# Patient Record
Sex: MALE | Race: WHITE | NOT HISPANIC OR LATINO | Employment: FULL TIME | URBAN - METROPOLITAN AREA
[De-identification: names, ages, dates, MRNs, and addresses within clinical notes are randomized per-mention and may not be internally consistent; named-entity substitution may affect disease eponyms.]

---

## 2020-11-30 ENCOUNTER — LAB VISIT (OUTPATIENT)
Dept: PRIMARY CARE CLINIC | Facility: OTHER | Age: 40
End: 2020-11-30
Attending: INTERNAL MEDICINE
Payer: COMMERCIAL

## 2020-11-30 DIAGNOSIS — Z03.818 ENCOUNTER FOR OBSERVATION FOR SUSPECTED EXPOSURE TO OTHER BIOLOGICAL AGENTS RULED OUT: ICD-10-CM

## 2020-11-30 PROCEDURE — U0003 INFECTIOUS AGENT DETECTION BY NUCLEIC ACID (DNA OR RNA); SEVERE ACUTE RESPIRATORY SYNDROME CORONAVIRUS 2 (SARS-COV-2) (CORONAVIRUS DISEASE [COVID-19]), AMPLIFIED PROBE TECHNIQUE, MAKING USE OF HIGH THROUGHPUT TECHNOLOGIES AS DESCRIBED BY CMS-2020-01-R: HCPCS

## 2020-12-02 LAB — SARS-COV-2 RNA RESP QL NAA+PROBE: NOT DETECTED

## 2022-02-09 ENCOUNTER — OFFICE VISIT (OUTPATIENT)
Dept: INTERNAL MEDICINE | Facility: CLINIC | Age: 42
End: 2022-02-09
Payer: COMMERCIAL

## 2022-02-09 VITALS
HEIGHT: 71 IN | HEART RATE: 56 BPM | DIASTOLIC BLOOD PRESSURE: 82 MMHG | WEIGHT: 147 LBS | SYSTOLIC BLOOD PRESSURE: 140 MMHG | OXYGEN SATURATION: 99 % | BODY MASS INDEX: 20.58 KG/M2

## 2022-02-09 DIAGNOSIS — F41.9 ANXIETY: ICD-10-CM

## 2022-02-09 DIAGNOSIS — R03.0 ELEVATED BLOOD PRESSURE READING WITHOUT DIAGNOSIS OF HYPERTENSION: ICD-10-CM

## 2022-02-09 DIAGNOSIS — Z00.00 VISIT FOR ANNUAL HEALTH EXAMINATION: Primary | ICD-10-CM

## 2022-02-09 DIAGNOSIS — F32.A DEPRESSION, UNSPECIFIED DEPRESSION TYPE: ICD-10-CM

## 2022-02-09 PROCEDURE — 3044F HG A1C LEVEL LT 7.0%: CPT | Mod: CPTII,S$GLB,, | Performed by: INTERNAL MEDICINE

## 2022-02-09 PROCEDURE — 3079F DIAST BP 80-89 MM HG: CPT | Mod: CPTII,S$GLB,, | Performed by: INTERNAL MEDICINE

## 2022-02-09 PROCEDURE — 3044F PR MOST RECENT HEMOGLOBIN A1C LEVEL <7.0%: ICD-10-PCS | Mod: CPTII,S$GLB,, | Performed by: INTERNAL MEDICINE

## 2022-02-09 PROCEDURE — 1159F PR MEDICATION LIST DOCUMENTED IN MEDICAL RECORD: ICD-10-PCS | Mod: CPTII,S$GLB,, | Performed by: INTERNAL MEDICINE

## 2022-02-09 PROCEDURE — 99999 PR PBB SHADOW E&M-EST. PATIENT-LVL IV: ICD-10-PCS | Mod: PBBFAC,,, | Performed by: INTERNAL MEDICINE

## 2022-02-09 PROCEDURE — 1159F MED LIST DOCD IN RCRD: CPT | Mod: CPTII,S$GLB,, | Performed by: INTERNAL MEDICINE

## 2022-02-09 PROCEDURE — 99386 PR PREVENTIVE VISIT,NEW,40-64: ICD-10-PCS | Mod: S$GLB,,, | Performed by: INTERNAL MEDICINE

## 2022-02-09 PROCEDURE — 99386 PREV VISIT NEW AGE 40-64: CPT | Mod: S$GLB,,, | Performed by: INTERNAL MEDICINE

## 2022-02-09 PROCEDURE — 99999 PR PBB SHADOW E&M-EST. PATIENT-LVL IV: CPT | Mod: PBBFAC,,, | Performed by: INTERNAL MEDICINE

## 2022-02-09 PROCEDURE — 3077F PR MOST RECENT SYSTOLIC BLOOD PRESSURE >= 140 MM HG: ICD-10-PCS | Mod: CPTII,S$GLB,, | Performed by: INTERNAL MEDICINE

## 2022-02-09 PROCEDURE — 3077F SYST BP >= 140 MM HG: CPT | Mod: CPTII,S$GLB,, | Performed by: INTERNAL MEDICINE

## 2022-02-09 PROCEDURE — 1160F PR REVIEW ALL MEDS BY PRESCRIBER/CLIN PHARMACIST DOCUMENTED: ICD-10-PCS | Mod: CPTII,S$GLB,, | Performed by: INTERNAL MEDICINE

## 2022-02-09 PROCEDURE — 3008F PR BODY MASS INDEX (BMI) DOCUMENTED: ICD-10-PCS | Mod: CPTII,S$GLB,, | Performed by: INTERNAL MEDICINE

## 2022-02-09 PROCEDURE — 1160F RVW MEDS BY RX/DR IN RCRD: CPT | Mod: CPTII,S$GLB,, | Performed by: INTERNAL MEDICINE

## 2022-02-09 PROCEDURE — 3079F PR MOST RECENT DIASTOLIC BLOOD PRESSURE 80-89 MM HG: ICD-10-PCS | Mod: CPTII,S$GLB,, | Performed by: INTERNAL MEDICINE

## 2022-02-09 PROCEDURE — 3008F BODY MASS INDEX DOCD: CPT | Mod: CPTII,S$GLB,, | Performed by: INTERNAL MEDICINE

## 2022-02-09 RX ORDER — ESCITALOPRAM OXALATE 10 MG/1
10 TABLET ORAL DAILY
COMMUNITY
Start: 2022-01-11

## 2022-02-09 RX ORDER — LORAZEPAM 0.5 MG/1
0.25 TABLET ORAL EVERY 6 HOURS PRN
COMMUNITY

## 2022-02-09 NOTE — PROGRESS NOTES
"INTERNAL MEDICINE INITIAL VISIT NOTE      CHIEF COMPLAINT     Chief Complaint   Patient presents with    Annual Exam    Establish Care       HPI     Neo Pérez is a 41 y.o. male with depression, anxiety, recent Covid-19 infection here today to establish care. Established with [facility] psychiatrist who is managing mood disorders - not seeing regularly. Started on Lexapro and Ativan about 9 mo ago. Taking Ativan PRN up to three times weekly.     Also has psychologist, speaks to 1-2x monthly; finds very helpful.     Past Medical History:  Past Medical History:   Diagnosis Date    Anxiety     Depression        Review of Systems:  Review of Systems   Constitutional: Negative for chills and fever.   HENT: Negative for congestion.    Respiratory: Negative for cough and shortness of breath.    Cardiovascular: Negative for chest pain.   Gastrointestinal: Negative for constipation, nausea and vomiting.   Genitourinary: Negative for hematuria and urgency.   Musculoskeletal: Negative for falls.   Skin: Negative for rash.   Neurological: Negative for dizziness and loss of consciousness.       Health Maintenance:   Immunizations:   Influenza - complete  Tdap - reports completion  Covid 19 - complete  HPV  Prevnar rec at 66  Shingrix rec at 51    Cancer Screening:  PAP: n/a  Mammogram:  n/a  Colonoscopy:  n/a  DEXA:  n/a      PHYSICAL EXAM     BP (!) 140/82 (BP Location: Left arm, Patient Position: Sitting)   Pulse (!) 56   Ht 5' 11" (1.803 m)   Wt 66.7 kg (147 lb)   SpO2 99%   BMI 20.50 kg/m²     GEN - A+OX4, NAD   HEENT - PERRL, EOMI,   Neck - No thyromegaly or thyroid masses felt.  No cervical lymphadenopathy appreciated.  CV - RRR, no m/r/g  Chest - CTAB, no wheezing, crackles, or rhonchi  Abd - S/NT/ND/+BS.   Ext - 2+BDP. No C/C/E.  Skin - Normal color and texture, no rash, no skin lesions.    ASSESSMENT/PLAN     Neo Pérez is a 41 y.o. male with conditions as above.     Visit for annual health examination  -     " CBC Auto Differential; Future; Expected date: 02/09/2022  -     Comprehensive Metabolic Panel; Future; Expected date: 02/09/2022  -     Hemoglobin A1C; Future; Expected date: 02/09/2022  -     Lipid Panel; Future; Expected date: 02/09/2022    Depression, unspecified depression type  Managed per OSH psychiatry    Anxiety  Managed per OSH psychiatry; on Lexapro, Ativan PRN  -     TSH; Future; Expected date: 02/09/2022    Elevated blood pressure reading without diagnosis of hypertension  Elevated initially, somewhat improved on repeat  Discussed low Na diet, increasing physical activity and stress reduction measures    HM as above.    RTC in 6 mo, sooner if needed and depending on labs.    Haritha Mendoza MD  Department of Internal Medicine - [facility]  02/13/2022

## 2022-02-09 NOTE — PATIENT INSTRUCTIONS
"Start increasing physical activity, 15 minutes of walking/biking/running at least 3-4 times a week.     Patient Education       Low Salt Diet   About this topic   Sodium is a type of mineral found in many foods. It may also be called "salt." Sodium helps balance fluids in your body. Too much sodium may be bad for your health. You may have to limit the amount of sodium in your food.  Salt is known as sodium chloride. It is measured in grams (g) or milligrams (mg). Salt or sodium in our diet comes from 3 main sources:  · Some sodium is naturally found in food.  · We may add salt to our food when we eat or cook.  · Processed foods give us most of the sodium in our diet.         What will the results be?   This diet may help lower your blood pressure. It may also help reduce extra water in your body. This may help kidney, heart, or liver problems.  What changes to diet are needed?   You need to know how much sodium is in the food you eat. Read food labels with care. Choose foods that have 5% or less sodium in one serving. Remember, if you eat more than one serving, you will be getting more sodium. It may take a while for your sense of taste to get used to food with less sodium. Be patient with yourself. You may be surprised at how well you will do.  Try to aim for a diet that has 2,300 mg (2.3 g) or less sodium in it each day. The Food & Drug Administration (FDA) has set up guidelines for food labels. These will help you make healthy choices. Look for these terms on food packages:  · Sodium-free: Less than 5 mg in each serving. These are safe to eat.  · Very low sodium: 35 mg of sodium or less in each serving. These are safe to eat.  · Low sodium: 140 mg of sodium or less in each serving. You need to eat these with care.  · Reduced sodium: At least 25% less sodium than is most often found in each serving. These foods can still be high in sodium.  · Light in sodium: 50% less sodium in each serving.  · Unsalted, no added " salt, and without added salt: No salt is added during processing, but the food may still have sodium. Read the food label and check the sodium content before eating.  What foods are good to eat?   You can control the amount of sodium in foods you make at home. Fresh foods that you cook are most often lower in sodium.  · Regular bread, unsalted crackers, dry cereal, cooked rice, pasta, quinoa, and corn tortillas.  · Fresh, frozen, low sodium, or salt-free canned vegetables. Limit vegetable juice or tomato juice to 1/2 cup (120 mL) each day.  · Fresh, frozen, canned, or dried fruit without salt added  · Nonfat or low-fat milk and yogurt, low-sodium cottage cheese, and other cheeses low in sodium.  · Fresh or frozen beef, veal, lamb, pork, poultry, fish, shellfish  · Low sodium canned meats, frozen dinners with less than 600 mg sodium  · Corn, safflower, sunflower, and soybean oils and unsalted nuts and seeds  · Egg and egg substitutes without added sodium  · Dried peas, beans, and low-sodium peanut butter  · All plain oils and low-sodium salad dressing  · Low-sodium broths, soups, soy sauce, condiments, and snack foods  · Pepper, herbs, spices, vinegar, lemon or lime juice  · Low-sodium carbonated drinks  What foods should be limited or avoided?   Foods that are prepackaged or canned are most often high in sodium. Foods to limit or avoid include:  · Salted breads, rolls, crackers, biscuits, cornbread  · Quick breads, self-rising flours, biscuit mixes, regular bread crumbs, instant hot cereals  · Commercially prepared rice, pasta, or stuffing mixes; potatoes and vegetable mixes  · Regular canned vegetables and juices, vegetables with sauce, and pickled vegetables  · Frozen vegetables with seasonings and sauces  · Processed fruits with salt or sodium  · Malted and chocolate milk and buttermilk  · Regular and processed cheese and spreads  · Smoked, cured, salted, or canned meat, fish, or poultry such as wilder, sausages,  sardines, chipped beef, hot dogs, cold cuts, and frozen breaded meats  · Salted and canned peas, beans, and olives  · Salted snack foods and nuts  · Oils mixed with high-sodium parts such as salad dressing  · Meat tenderizers, seasoning salt, and most flavored vinegars  · Ketchup, bouillon cubes, salt, sea salt, kosher salt, onion salt, garlic salt, and pink Himalayan salt  What can be done to prevent this health problem?   Check with your doctor before using salt substitutes. Also, check the labels when taking over-the-counter (OTC) drugs such as laxatives and antacids. These can have a high sodium content.  When do I need to call the doctor?   Call your doctor if you have questions about this diet. Talk to a dietitian. They can help you find hidden sources of sodium in the food you eat.  Helpful tips   · Use the nutrition facts labels as a guide to look for foods lower in sodium.  · Do not use salt when eating or cooking.  · Select fresh fruits and vegetables for snacks.  · If you are eating out, ask the  to cook your food without salt, or choose foods without sauces.  · Season your food with herbs and spices.  · Drain and rinse canned beans and vegetables that contain sodium.  · Eat foods with potassium. Potassium helps counter the effects of sodium. This may help lower your blood pressure. Foods high in potassium include: Potatoes, tomatoes, bananas, oranges, cantaloupe, beans, and greens.  Where can I learn more?   American Heart Association  https://www.heart.org/en/healthy-living/healthy-eating/eat-smart/sodium/how-to-reduce-sodium   American Heart Association  https://www.heart.org/en/healthy-living/healthy-eating/eat-smart/nutrition-basics/food-packaging-claims   NHS  https://www.nhs.uk/live-well/eat-well/tips-for-a-lower-salt-diet/   UpToDate  http://www.[employer]/contents/low-sodium-diet-beyond-the-basics   Last Reviewed Date   2021-10-11  Consumer Information Use and Disclaimer   This information  is not specific medical advice and does not replace information you receive from your health care provider. This is only a brief summary of general information. It does NOT include all information about conditions, illnesses, injuries, tests, procedures, treatments, therapies, discharge instructions or life-style choices that may apply to you. You must talk with your health care provider for complete information about your health and treatment options. This information should not be used to decide whether or not to accept your health care providers advice, instructions or recommendations. Only your health care provider has the knowledge and training to provide advice that is right for you.  Copyright   Copyright © 2021 UpToDate, Inc. and its affiliates and/or licensors. All rights reserved.

## 2022-02-10 ENCOUNTER — LAB VISIT (OUTPATIENT)
Dept: LAB | Facility: HOSPITAL | Age: 42
End: 2022-02-10
Payer: COMMERCIAL

## 2022-02-10 DIAGNOSIS — F41.9 ANXIETY: ICD-10-CM

## 2022-02-10 DIAGNOSIS — Z00.00 VISIT FOR ANNUAL HEALTH EXAMINATION: ICD-10-CM

## 2022-02-10 LAB
ALBUMIN SERPL BCP-MCNC: 4 G/DL (ref 3.5–5.2)
ALP SERPL-CCNC: 63 U/L (ref 55–135)
ALT SERPL W/O P-5'-P-CCNC: 75 U/L (ref 10–44)
ANION GAP SERPL CALC-SCNC: 14 MMOL/L (ref 8–16)
AST SERPL-CCNC: 36 U/L (ref 10–40)
BASOPHILS # BLD AUTO: 0.04 K/UL (ref 0–0.2)
BASOPHILS NFR BLD: 0.7 % (ref 0–1.9)
BILIRUB SERPL-MCNC: 0.8 MG/DL (ref 0.1–1)
BUN SERPL-MCNC: 12 MG/DL (ref 6–20)
CALCIUM SERPL-MCNC: 9.5 MG/DL (ref 8.7–10.5)
CHLORIDE SERPL-SCNC: 105 MMOL/L (ref 95–110)
CHOLEST SERPL-MCNC: 203 MG/DL (ref 120–199)
CHOLEST/HDLC SERPL: 3.8 {RATIO} (ref 2–5)
CO2 SERPL-SCNC: 24 MMOL/L (ref 23–29)
CREAT SERPL-MCNC: 0.8 MG/DL (ref 0.5–1.4)
DIFFERENTIAL METHOD: NORMAL
EOSINOPHIL # BLD AUTO: 0.2 K/UL (ref 0–0.5)
EOSINOPHIL NFR BLD: 2.5 % (ref 0–8)
ERYTHROCYTE [DISTWIDTH] IN BLOOD BY AUTOMATED COUNT: 12.4 % (ref 11.5–14.5)
EST. GFR  (AFRICAN AMERICAN): >60 ML/MIN/1.73 M^2
EST. GFR  (NON AFRICAN AMERICAN): >60 ML/MIN/1.73 M^2
ESTIMATED AVG GLUCOSE: 103 MG/DL (ref 68–131)
GLUCOSE SERPL-MCNC: 87 MG/DL (ref 70–110)
HBA1C MFR BLD: 5.2 % (ref 4–5.6)
HCT VFR BLD AUTO: 44.2 % (ref 40–54)
HDLC SERPL-MCNC: 54 MG/DL (ref 40–75)
HDLC SERPL: 26.6 % (ref 20–50)
HGB BLD-MCNC: 14.4 G/DL (ref 14–18)
IMM GRANULOCYTES # BLD AUTO: 0.01 K/UL (ref 0–0.04)
IMM GRANULOCYTES NFR BLD AUTO: 0.2 % (ref 0–0.5)
LDLC SERPL CALC-MCNC: 134 MG/DL (ref 63–159)
LYMPHOCYTES # BLD AUTO: 1.9 K/UL (ref 1–4.8)
LYMPHOCYTES NFR BLD: 30.4 % (ref 18–48)
MCH RBC QN AUTO: 27.9 PG (ref 27–31)
MCHC RBC AUTO-ENTMCNC: 32.6 G/DL (ref 32–36)
MCV RBC AUTO: 86 FL (ref 82–98)
MONOCYTES # BLD AUTO: 0.6 K/UL (ref 0.3–1)
MONOCYTES NFR BLD: 9.5 % (ref 4–15)
NEUTROPHILS # BLD AUTO: 3.5 K/UL (ref 1.8–7.7)
NEUTROPHILS NFR BLD: 56.7 % (ref 38–73)
NONHDLC SERPL-MCNC: 149 MG/DL
NRBC BLD-RTO: 0 /100 WBC
PLATELET # BLD AUTO: 250 K/UL (ref 150–450)
PMV BLD AUTO: 9.7 FL (ref 9.2–12.9)
POTASSIUM SERPL-SCNC: 3.9 MMOL/L (ref 3.5–5.1)
PROT SERPL-MCNC: 6.9 G/DL (ref 6–8.4)
RBC # BLD AUTO: 5.16 M/UL (ref 4.6–6.2)
SODIUM SERPL-SCNC: 143 MMOL/L (ref 136–145)
TRIGL SERPL-MCNC: 75 MG/DL (ref 30–150)
TSH SERPL DL<=0.005 MIU/L-ACNC: 1.31 UIU/ML (ref 0.4–4)
WBC # BLD AUTO: 6.12 K/UL (ref 3.9–12.7)

## 2022-02-10 PROCEDURE — 83036 HEMOGLOBIN GLYCOSYLATED A1C: CPT | Performed by: INTERNAL MEDICINE

## 2022-02-10 PROCEDURE — 80061 LIPID PANEL: CPT | Performed by: INTERNAL MEDICINE

## 2022-02-10 PROCEDURE — 85025 COMPLETE CBC W/AUTO DIFF WBC: CPT | Performed by: INTERNAL MEDICINE

## 2022-02-10 PROCEDURE — 36415 COLL VENOUS BLD VENIPUNCTURE: CPT | Performed by: INTERNAL MEDICINE

## 2022-02-10 PROCEDURE — 84443 ASSAY THYROID STIM HORMONE: CPT | Performed by: INTERNAL MEDICINE

## 2022-02-10 PROCEDURE — 80053 COMPREHEN METABOLIC PANEL: CPT | Performed by: INTERNAL MEDICINE

## 2022-02-13 PROBLEM — F41.9 ANXIETY: Status: ACTIVE | Noted: 2022-02-13

## 2022-02-13 PROBLEM — F32.A DEPRESSION: Status: ACTIVE | Noted: 2022-02-13

## 2022-02-16 ENCOUNTER — TELEPHONE (OUTPATIENT)
Dept: INTERNAL MEDICINE | Facility: CLINIC | Age: 42
End: 2022-02-16
Payer: COMMERCIAL

## 2022-02-16 DIAGNOSIS — R74.01 ELEVATED ALT MEASUREMENT: Primary | ICD-10-CM

## 2022-02-16 NOTE — TELEPHONE ENCOUNTER
----- Message from Craig Mendoza MD sent at 2/16/2022  4:46 AM CST -----  Schedule hepatic panel in 1 mo.

## 2022-02-23 ENCOUNTER — PATIENT MESSAGE (OUTPATIENT)
Dept: INTERNAL MEDICINE | Facility: CLINIC | Age: 42
End: 2022-02-23
Payer: COMMERCIAL

## 2022-03-16 ENCOUNTER — LAB VISIT (OUTPATIENT)
Dept: LAB | Facility: HOSPITAL | Age: 42
End: 2022-03-16
Payer: COMMERCIAL

## 2022-03-16 DIAGNOSIS — R74.01 ELEVATED ALT MEASUREMENT: ICD-10-CM

## 2022-03-16 LAB
ALBUMIN SERPL BCP-MCNC: 4 G/DL (ref 3.5–5.2)
ALP SERPL-CCNC: 66 U/L (ref 55–135)
ALT SERPL W/O P-5'-P-CCNC: 59 U/L (ref 10–44)
AST SERPL-CCNC: 33 U/L (ref 10–40)
BILIRUB DIRECT SERPL-MCNC: 0.2 MG/DL (ref 0.1–0.3)
BILIRUB SERPL-MCNC: 0.6 MG/DL (ref 0.1–1)
PROT SERPL-MCNC: 6.7 G/DL (ref 6–8.4)

## 2022-03-16 PROCEDURE — 36415 COLL VENOUS BLD VENIPUNCTURE: CPT | Performed by: INTERNAL MEDICINE

## 2022-03-16 PROCEDURE — 80076 HEPATIC FUNCTION PANEL: CPT | Performed by: INTERNAL MEDICINE

## 2022-03-21 ENCOUNTER — PATIENT MESSAGE (OUTPATIENT)
Dept: INTERNAL MEDICINE | Facility: CLINIC | Age: 42
End: 2022-03-21
Payer: COMMERCIAL

## 2022-03-30 DIAGNOSIS — R74.01 ELEVATED ALT MEASUREMENT: Primary | ICD-10-CM

## 2024-01-10 ENCOUNTER — TELEPHONE (OUTPATIENT)
Dept: INTERNAL MEDICINE | Facility: CLINIC | Age: 44
End: 2024-01-10
Payer: COMMERCIAL

## 2024-01-10 NOTE — TELEPHONE ENCOUNTER
----- Message from Clint Agosto sent at 1/10/2024 11:29 AM CST -----  Regarding: Neo  Type:Patient Callback     Who called: Neo     What is the request in detail: The patient is the  of Kaylynn Pérez and he would like to become a patient of the doctor. He was referred by his wife. He needs to have an annual done. Please reach out to the patient.     Can the clinic reply by [facility]? Yes     Would the patient rather a call back or a response via [facility]? Callback or portal     Best call back number: [phone]      Additional Information:

## 2024-01-10 NOTE — TELEPHONE ENCOUNTER
Will call patient back tomorrow morning to schedule annual. He would like to go over the list of doctors first.

## 2024-01-26 ENCOUNTER — OFFICE VISIT (OUTPATIENT)
Dept: INTERNAL MEDICINE | Facility: CLINIC | Age: 44
End: 2024-01-26
Payer: COMMERCIAL

## 2024-01-26 VITALS
HEIGHT: 71 IN | BODY MASS INDEX: 21.88 KG/M2 | SYSTOLIC BLOOD PRESSURE: 110 MMHG | WEIGHT: 156.31 LBS | DIASTOLIC BLOOD PRESSURE: 80 MMHG | HEART RATE: 60 BPM | OXYGEN SATURATION: 98 %

## 2024-01-26 DIAGNOSIS — Z76.89 ENCOUNTER TO ESTABLISH CARE WITH NEW DOCTOR: ICD-10-CM

## 2024-01-26 DIAGNOSIS — Z00.00 ENCOUNTER FOR ANNUAL GENERAL MEDICAL EXAMINATION WITHOUT ABNORMAL FINDINGS IN ADULT: Primary | ICD-10-CM

## 2024-01-26 DIAGNOSIS — Z23 NEED FOR VACCINATION: ICD-10-CM

## 2024-01-26 DIAGNOSIS — F41.9 ANXIETY: ICD-10-CM

## 2024-01-26 DIAGNOSIS — F32.A DEPRESSION, UNSPECIFIED DEPRESSION TYPE: ICD-10-CM

## 2024-01-26 PROCEDURE — 1159F MED LIST DOCD IN RCRD: CPT | Mod: CPTII,S$GLB,, | Performed by: FAMILY MEDICINE

## 2024-01-26 PROCEDURE — 3079F DIAST BP 80-89 MM HG: CPT | Mod: CPTII,S$GLB,, | Performed by: FAMILY MEDICINE

## 2024-01-26 PROCEDURE — 90715 TDAP VACCINE 7 YRS/> IM: CPT | Mod: S$GLB,,, | Performed by: FAMILY MEDICINE

## 2024-01-26 PROCEDURE — 3008F BODY MASS INDEX DOCD: CPT | Mod: CPTII,S$GLB,, | Performed by: FAMILY MEDICINE

## 2024-01-26 PROCEDURE — 99999 PR PBB SHADOW E&M-EST. PATIENT-LVL IV: CPT | Mod: PBBFAC,,, | Performed by: FAMILY MEDICINE

## 2024-01-26 PROCEDURE — 90471 IMMUNIZATION ADMIN: CPT | Mod: S$GLB,,, | Performed by: FAMILY MEDICINE

## 2024-01-26 PROCEDURE — 1160F RVW MEDS BY RX/DR IN RCRD: CPT | Mod: CPTII,S$GLB,, | Performed by: FAMILY MEDICINE

## 2024-01-26 PROCEDURE — 3074F SYST BP LT 130 MM HG: CPT | Mod: CPTII,S$GLB,, | Performed by: FAMILY MEDICINE

## 2024-01-26 PROCEDURE — 99396 PREV VISIT EST AGE 40-64: CPT | Mod: 25,S$GLB,, | Performed by: FAMILY MEDICINE

## 2024-01-26 RX ORDER — OLANZAPINE 5 MG/1
5 TABLET ORAL DAILY
COMMUNITY
Start: 2023-10-04 | End: 2024-01-26

## 2024-01-26 NOTE — PROGRESS NOTES
Subjective:     Patient ID: Neo Pérez is a 43 y.o. male.   Chief Complaint: Establish Care and Annual Exam    HPI:  Patient presents to establish care.  Patient is due for annual exam and labs.    No refills needed.    Follows w/ Pyschiatry for anxiety/depression. Discussed w/ his provider to come off lexapro and zyprexa. Currently using ativan only PRN. He says he needs it maybe 2-3x/week.       Review of Problems & History:  Patient Active Problem List   Diagnosis    Depression    Anxiety      Past Medical History:   Diagnosis Date    Anxiety     Depression       Past Surgical History:   Procedure Laterality Date    CYST REMOVAL      L temple, testicles    WISDOM TOOTH EXTRACTION        Social History     Socioeconomic History    Marital status:    Tobacco Use    Smoking status: Never    Smokeless tobacco: Never   Substance and Sexual Activity    Alcohol use: Yes     Comment: 5 drinks weekly     Drug use: Never    Sexual activity: Yes     Partners: Female     Birth control/protection: None   Social History Narrative    Lives with wife, 2 children (7yo, 4yo)        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         Previously going to gym three times weekly when in [place]. Now doing yoga weekly, soccer/basketball.     Likes to bike around [place].           Health Maintenance:  Colon Cancer Screening  Screening not indicated by patient's age & family history  Lung Cancer Screening  Never smoker  Prostate Cancer Screening  Not indicated by age or history  ASCVD Risk  The 10-year ASCVD risk score (Yasmin PENDLETON, et al., 2019) is: 1%    Values used to calculate the score:      Age: 43 years      Sex: Male      Is Non- : No      Diabetic: No      Tobacco smoker: No      Systolic Blood Pressure: 110 mmHg      Is BP treated: No      HDL Cholesterol: 54 mg/dL      Total Cholesterol: 203 mg/dL   Lab Results   Component Value Date     "CHOL 203 (H) 02/10/2022    HDL 54 02/10/2022    TRIG 75 02/10/2022     Statin rx: no    Vaccines  Seasonal Influenza: UTD  COVID-19: UTD  RSV: Not indicated  Tetanus: Due for booster  Shingles: Not indicated  Pneumococcal: Not indicated    Medication Review:    Current Outpatient Medications:     LORazepam (ATIVAN) 0.25 MG split tablet, Take 0.25 mg by mouth every 6 (six) hours as needed for Anxiety., Disp: , Rfl:     EScitalopram oxalate (LEXAPRO) 10 MG tablet, Take 10 mg by mouth once daily., Disp: , Rfl:      Review of Systems   Constitutional:  Negative for chills, fatigue and fever.   HENT:  Negative for nasal congestion, ear pain, postnasal drip and sore throat.    Eyes:  Negative for visual disturbance.   Respiratory:  Negative for cough, shortness of breath and wheezing.    Cardiovascular:  Negative for chest pain and palpitations.   Gastrointestinal:  Negative for abdominal pain, change in bowel habit, constipation, diarrhea, nausea and vomiting.   Genitourinary:  Negative for dysuria and hematuria.   Musculoskeletal:  Negative for back pain, leg pain and neck pain.   Neurological:  Negative for dizziness, numbness and headaches.   Hematological:  Negative for adenopathy.   Psychiatric/Behavioral:  Negative for dysphoric mood, sleep disturbance and suicidal ideas. The patient is not nervous/anxious.           Objective:      Vitals:    01/26/24 1157   BP: 110/80   BP Location: Right arm   Patient Position: Sitting   BP Method: Medium (Manual)   Pulse: 60   SpO2: 98%   Weight: 70.9 kg (156 lb 4.9 oz)   Height: 5' 11" (1.803 m)      Physical Exam  Vitals and nursing note reviewed.   Constitutional:       General: He is not in acute distress.     Appearance: Normal appearance. He is not ill-appearing.   HENT:      Head: Normocephalic and atraumatic.      Right Ear: Tympanic membrane, ear canal and external ear normal. There is no impacted cerumen.      Left Ear: Tympanic membrane, ear canal and external ear " normal. There is no impacted cerumen.      Nose: Nose normal. No congestion or rhinorrhea.      Mouth/Throat:      Mouth: Mucous membranes are moist.      Pharynx: Oropharynx is clear. No oropharyngeal exudate or posterior oropharyngeal erythema.   Eyes:      General: No scleral icterus.        Right eye: No discharge.         Left eye: No discharge.      Conjunctiva/sclera: Conjunctivae normal.   Neck:      Thyroid: No thyroid mass, thyromegaly or thyroid tenderness.   Cardiovascular:      Rate and Rhythm: Normal rate and regular rhythm.      Pulses: Normal pulses.      Heart sounds: Normal heart sounds. No murmur heard.     No friction rub. No gallop.   Pulmonary:      Effort: Pulmonary effort is normal. No respiratory distress.      Breath sounds: Normal breath sounds. No wheezing, rhonchi or rales.   Abdominal:      General: Abdomen is flat. Bowel sounds are normal. There is no distension.      Palpations: Abdomen is soft. There is no mass.      Tenderness: There is no abdominal tenderness. There is no guarding.   Musculoskeletal:         General: No swelling or deformity. Normal range of motion.      Cervical back: Normal range of motion and neck supple. No tenderness.   Lymphadenopathy:      Cervical: No cervical adenopathy.   Skin:     General: Skin is warm and dry.   Neurological:      General: No focal deficit present.      Mental Status: He is alert and oriented to person, place, and time. Mental status is at baseline.      Gait: Gait normal.      Deep Tendon Reflexes: Reflexes normal.   Psychiatric:         Mood and Affect: Mood normal.         Behavior: Behavior normal.         Thought Content: Thought content normal.         Judgment: Judgment normal.         Assessment:       Problem List Items Addressed This Visit          Psychiatric    Depression    Anxiety     Other Visit Diagnoses       Encounter for annual general medical examination without abnormal findings in adult    -  Primary    Relevant  Orders    Comprehensive Metabolic Panel    CBC Auto Differential    Lipid Panel    Hemoglobin A1C    TSH    T4, Free    Hepatitis C Antibody    HIV 1/2 Ag/Ab (4th Gen)    Encounter to establish care with new doctor        Need for vaccination        Relevant Orders    Tdap Vaccine (Completed)              Plan:       1. Encounter for annual general medical examination without abnormal findings in adult  Health maintenance updated  Chronic issues reviewed  Fasting labs ordered  -     Comprehensive Metabolic Panel; Future; Expected date: 01/26/2024  -     CBC Auto Differential; Future; Expected date: 01/26/2024  -     Lipid Panel; Future; Expected date: 01/26/2024  -     Hemoglobin A1C; Future; Expected date: 01/26/2024  -     TSH; Future; Expected date: 01/26/2024  -     T4, Free; Future; Expected date: 01/26/2024  -     Hepatitis C Antibody; Future; Expected date: 01/26/2024  -     HIV 1/2 Ag/Ab (4th Gen); Future; Expected date: 01/26/2024    2. Encounter to establish care with new doctor  Reviewed chronic medical conditions, updated problem list and medication list    3. Need for vaccination  Tetanus administered in clinic  -     Tdap Vaccine    4. Depression, unspecified depression type  No changes to meds today  Pt stable on PRN ativan, no refill needed    5. Anxiety  As above (#4)             Follow up in about 1 year (around 1/26/2025) for Annual Visit, Fasting labs.     García Perez MD, FAAFP  Family Medicine Physician  [facility]  01/26/2024

## 2024-01-27 ENCOUNTER — LAB VISIT (OUTPATIENT)
Dept: LAB | Facility: HOSPITAL | Age: 44
End: 2024-01-27
Attending: FAMILY MEDICINE
Payer: COMMERCIAL

## 2024-01-27 DIAGNOSIS — Z00.00 ENCOUNTER FOR ANNUAL GENERAL MEDICAL EXAMINATION WITHOUT ABNORMAL FINDINGS IN ADULT: ICD-10-CM

## 2024-01-27 LAB
ALBUMIN SERPL BCP-MCNC: 3.9 G/DL (ref 3.5–5.2)
ALP SERPL-CCNC: 68 U/L (ref 55–135)
ALT SERPL W/O P-5'-P-CCNC: 60 U/L (ref 10–44)
ANION GAP SERPL CALC-SCNC: 5 MMOL/L (ref 8–16)
AST SERPL-CCNC: 24 U/L (ref 10–40)
BASOPHILS # BLD AUTO: 0.05 K/UL (ref 0–0.2)
BASOPHILS NFR BLD: 0.9 % (ref 0–1.9)
BILIRUB SERPL-MCNC: 1.1 MG/DL (ref 0.1–1)
BUN SERPL-MCNC: 18 MG/DL (ref 6–20)
CALCIUM SERPL-MCNC: 9.7 MG/DL (ref 8.7–10.5)
CHLORIDE SERPL-SCNC: 108 MMOL/L (ref 95–110)
CHOLEST SERPL-MCNC: 205 MG/DL (ref 120–199)
CHOLEST/HDLC SERPL: 4.3 {RATIO} (ref 2–5)
CO2 SERPL-SCNC: 27 MMOL/L (ref 23–29)
CREAT SERPL-MCNC: 1 MG/DL (ref 0.5–1.4)
DIFFERENTIAL METHOD BLD: NORMAL
EOSINOPHIL # BLD AUTO: 0.1 K/UL (ref 0–0.5)
EOSINOPHIL NFR BLD: 2.4 % (ref 0–8)
ERYTHROCYTE [DISTWIDTH] IN BLOOD BY AUTOMATED COUNT: 12.7 % (ref 11.5–14.5)
EST. GFR  (NO RACE VARIABLE): >60 ML/MIN/1.73 M^2
ESTIMATED AVG GLUCOSE: 105 MG/DL (ref 68–131)
GLUCOSE SERPL-MCNC: 90 MG/DL (ref 70–110)
HBA1C MFR BLD: 5.3 % (ref 4–5.6)
HCT VFR BLD AUTO: 43.3 % (ref 40–54)
HCV AB SERPL QL IA: NORMAL
HDLC SERPL-MCNC: 48 MG/DL (ref 40–75)
HDLC SERPL: 23.4 % (ref 20–50)
HGB BLD-MCNC: 14.6 G/DL (ref 14–18)
HIV 1+2 AB+HIV1 P24 AG SERPL QL IA: NORMAL
IMM GRANULOCYTES # BLD AUTO: 0.02 K/UL (ref 0–0.04)
IMM GRANULOCYTES NFR BLD AUTO: 0.3 % (ref 0–0.5)
LDLC SERPL CALC-MCNC: 142 MG/DL (ref 63–159)
LYMPHOCYTES # BLD AUTO: 1.8 K/UL (ref 1–4.8)
LYMPHOCYTES NFR BLD: 29.9 % (ref 18–48)
MCH RBC QN AUTO: 28.2 PG (ref 27–31)
MCHC RBC AUTO-ENTMCNC: 33.7 G/DL (ref 32–36)
MCV RBC AUTO: 84 FL (ref 82–98)
MONOCYTES # BLD AUTO: 0.6 K/UL (ref 0.3–1)
MONOCYTES NFR BLD: 9.7 % (ref 4–15)
NEUTROPHILS # BLD AUTO: 3.3 K/UL (ref 1.8–7.7)
NEUTROPHILS NFR BLD: 56.8 % (ref 38–73)
NONHDLC SERPL-MCNC: 157 MG/DL
NRBC BLD-RTO: 0 /100 WBC
PLATELET # BLD AUTO: 267 K/UL (ref 150–450)
PMV BLD AUTO: 9.5 FL (ref 9.2–12.9)
POTASSIUM SERPL-SCNC: 4.7 MMOL/L (ref 3.5–5.1)
PROT SERPL-MCNC: 6.7 G/DL (ref 6–8.4)
RBC # BLD AUTO: 5.18 M/UL (ref 4.6–6.2)
SODIUM SERPL-SCNC: 140 MMOL/L (ref 136–145)
T4 FREE SERPL-MCNC: 0.95 NG/DL (ref 0.71–1.51)
TRIGL SERPL-MCNC: 75 MG/DL (ref 30–150)
TSH SERPL DL<=0.005 MIU/L-ACNC: 1.43 UIU/ML (ref 0.4–4)
WBC # BLD AUTO: 5.85 K/UL (ref 3.9–12.7)

## 2024-01-27 PROCEDURE — 84443 ASSAY THYROID STIM HORMONE: CPT | Performed by: FAMILY MEDICINE

## 2024-01-27 PROCEDURE — 85025 COMPLETE CBC W/AUTO DIFF WBC: CPT | Performed by: FAMILY MEDICINE

## 2024-01-27 PROCEDURE — 83036 HEMOGLOBIN GLYCOSYLATED A1C: CPT | Performed by: FAMILY MEDICINE

## 2024-01-27 PROCEDURE — 84439 ASSAY OF FREE THYROXINE: CPT | Performed by: FAMILY MEDICINE

## 2024-01-27 PROCEDURE — 36415 COLL VENOUS BLD VENIPUNCTURE: CPT | Performed by: FAMILY MEDICINE

## 2024-01-27 PROCEDURE — 80053 COMPREHEN METABOLIC PANEL: CPT | Performed by: FAMILY MEDICINE

## 2024-01-27 PROCEDURE — 86803 HEPATITIS C AB TEST: CPT | Performed by: FAMILY MEDICINE

## 2024-01-27 PROCEDURE — 80061 LIPID PANEL: CPT | Performed by: FAMILY MEDICINE

## 2024-01-27 PROCEDURE — 87389 HIV-1 AG W/HIV-1&-2 AB AG IA: CPT | Performed by: FAMILY MEDICINE

## 2024-04-26 ENCOUNTER — PATIENT MESSAGE (OUTPATIENT)
Dept: INTERNAL MEDICINE | Facility: CLINIC | Age: 44
End: 2024-04-26
Payer: COMMERCIAL

## 2024-04-26 NOTE — TELEPHONE ENCOUNTER
Pt requesting lab interpretation from 01/27/2024 labs.    Please review portal msg and respond,  Thank You.

## 2024-06-10 ENCOUNTER — PATIENT MESSAGE (OUTPATIENT)
Dept: INTERNAL MEDICINE | Facility: CLINIC | Age: 44
End: 2024-06-10
Payer: COMMERCIAL

## 2025-02-21 ENCOUNTER — OFFICE VISIT (OUTPATIENT)
Dept: INTERNAL MEDICINE | Facility: CLINIC | Age: 45
End: 2025-02-21
Payer: COMMERCIAL

## 2025-02-21 ENCOUNTER — LAB VISIT (OUTPATIENT)
Dept: LAB | Facility: HOSPITAL | Age: 45
End: 2025-02-21
Attending: INTERNAL MEDICINE
Payer: COMMERCIAL

## 2025-02-21 VITALS
WEIGHT: 153.25 LBS | DIASTOLIC BLOOD PRESSURE: 72 MMHG | SYSTOLIC BLOOD PRESSURE: 116 MMHG | HEART RATE: 50 BPM | HEIGHT: 71 IN | OXYGEN SATURATION: 100 % | BODY MASS INDEX: 21.45 KG/M2

## 2025-02-21 DIAGNOSIS — Z00.00 ANNUAL PHYSICAL EXAM: Primary | ICD-10-CM

## 2025-02-21 DIAGNOSIS — Z80.42 FAMILY HISTORY OF PROSTATE CANCER: ICD-10-CM

## 2025-02-21 DIAGNOSIS — F32.4 MAJOR DEPRESSIVE DISORDER WITH SINGLE EPISODE, IN PARTIAL REMISSION: ICD-10-CM

## 2025-02-21 DIAGNOSIS — Z00.00 ANNUAL PHYSICAL EXAM: ICD-10-CM

## 2025-02-21 LAB
ALBUMIN SERPL BCP-MCNC: 4.3 G/DL (ref 3.5–5.2)
ALP SERPL-CCNC: 72 U/L (ref 40–150)
ALT SERPL W/O P-5'-P-CCNC: 31 U/L (ref 10–44)
ANION GAP SERPL CALC-SCNC: 8 MMOL/L (ref 8–16)
AST SERPL-CCNC: 24 U/L (ref 10–40)
BASOPHILS # BLD AUTO: 0.07 K/UL (ref 0–0.2)
BASOPHILS NFR BLD: 1.3 % (ref 0–1.9)
BILIRUB SERPL-MCNC: 0.8 MG/DL (ref 0.1–1)
BUN SERPL-MCNC: 15 MG/DL (ref 6–20)
CALCIUM SERPL-MCNC: 9.5 MG/DL (ref 8.7–10.5)
CHLORIDE SERPL-SCNC: 107 MMOL/L (ref 95–110)
CHOLEST SERPL-MCNC: 209 MG/DL (ref 120–199)
CHOLEST/HDLC SERPL: 4.2 {RATIO} (ref 2–5)
CO2 SERPL-SCNC: 25 MMOL/L (ref 23–29)
CREAT SERPL-MCNC: 0.9 MG/DL (ref 0.5–1.4)
DIFFERENTIAL METHOD BLD: NORMAL
EOSINOPHIL # BLD AUTO: 0.1 K/UL (ref 0–0.5)
EOSINOPHIL NFR BLD: 2.5 % (ref 0–8)
ERYTHROCYTE [DISTWIDTH] IN BLOOD BY AUTOMATED COUNT: 12.8 % (ref 11.5–14.5)
EST. GFR  (NO RACE VARIABLE): >60 ML/MIN/1.73 M^2
ESTIMATED AVG GLUCOSE: 105 MG/DL (ref 68–131)
GLUCOSE SERPL-MCNC: 86 MG/DL (ref 70–110)
HBA1C MFR BLD: 5.3 % (ref 4–5.6)
HCT VFR BLD AUTO: 47.1 % (ref 40–54)
HDLC SERPL-MCNC: 50 MG/DL (ref 40–75)
HDLC SERPL: 23.9 % (ref 20–50)
HGB BLD-MCNC: 15.5 G/DL (ref 14–18)
IMM GRANULOCYTES # BLD AUTO: 0.02 K/UL (ref 0–0.04)
IMM GRANULOCYTES NFR BLD AUTO: 0.4 % (ref 0–0.5)
LDLC SERPL CALC-MCNC: 144.2 MG/DL (ref 63–159)
LYMPHOCYTES # BLD AUTO: 1.9 K/UL (ref 1–4.8)
LYMPHOCYTES NFR BLD: 34 % (ref 18–48)
MCH RBC QN AUTO: 28.4 PG (ref 27–31)
MCHC RBC AUTO-ENTMCNC: 32.9 G/DL (ref 32–36)
MCV RBC AUTO: 86 FL (ref 82–98)
MONOCYTES # BLD AUTO: 0.6 K/UL (ref 0.3–1)
MONOCYTES NFR BLD: 10.5 % (ref 4–15)
NEUTROPHILS # BLD AUTO: 2.8 K/UL (ref 1.8–7.7)
NEUTROPHILS NFR BLD: 51.3 % (ref 38–73)
NONHDLC SERPL-MCNC: 159 MG/DL
NRBC BLD-RTO: 0 /100 WBC
PLATELET # BLD AUTO: 250 K/UL (ref 150–450)
PMV BLD AUTO: 9.7 FL (ref 9.2–12.9)
POTASSIUM SERPL-SCNC: 4.5 MMOL/L (ref 3.5–5.1)
PROT SERPL-MCNC: 7.4 G/DL (ref 6–8.4)
RBC # BLD AUTO: 5.45 M/UL (ref 4.6–6.2)
SODIUM SERPL-SCNC: 140 MMOL/L (ref 136–145)
TRIGL SERPL-MCNC: 74 MG/DL (ref 30–150)
WBC # BLD AUTO: 5.5 K/UL (ref 3.9–12.7)

## 2025-02-21 PROCEDURE — 85025 COMPLETE CBC W/AUTO DIFF WBC: CPT | Performed by: INTERNAL MEDICINE

## 2025-02-21 PROCEDURE — 80061 LIPID PANEL: CPT | Performed by: INTERNAL MEDICINE

## 2025-02-21 PROCEDURE — 83036 HEMOGLOBIN GLYCOSYLATED A1C: CPT | Performed by: INTERNAL MEDICINE

## 2025-02-21 PROCEDURE — 80053 COMPREHEN METABOLIC PANEL: CPT | Performed by: INTERNAL MEDICINE

## 2025-02-21 PROCEDURE — 99999 PR PBB SHADOW E&M-EST. PATIENT-LVL IV: CPT | Mod: PBBFAC,,, | Performed by: INTERNAL MEDICINE

## 2025-02-21 RX ORDER — LORAZEPAM 0.5 MG/1
0.25 TABLET ORAL EVERY 6 HOURS PRN
Qty: 30 TABLET | Refills: 1 | Status: SHIPPED | OUTPATIENT
Start: 2025-02-21 | End: 2025-03-23

## 2025-02-21 NOTE — PROGRESS NOTES
"    CHIEF COMPLAINT     Chief Complaint   Patient presents with    [facility]       HPI     Neo Pérez is a 44 y.o. male here today for     Anxiety/Depression  History when younger, 1 major episode associate with job/life stress.   Previously on medication  In talk therapy Rodney Cobb.- 2 year treatment course, good relationship    Lifestyle better here.  Diet, sleep and health    Personally Reviewed Patient's Medical, surgical, family and social hx. Changes updated in [facility].  Care Team updated in Epic    Review of Systems:  Review of Systems   Musculoskeletal:  Negative for back pain.   Psychiatric/Behavioral:  Negative for dysphoric mood and sleep disturbance. The patient is not nervous/anxious.        Health Maintenance:   Reviewed with patient  Due for the following:      PHYSICAL EXAM     /72 (BP Location: Right arm, Patient Position: Sitting)   Pulse (!) 50   Ht 5' 11" (1.803 m)   Wt 69.5 kg (153 lb 3.5 oz)   SpO2 100%   BMI 21.37 kg/m²     Gen: Well Appearing, NAD  HEENT: PERR, EOMI  Neck: FROM, no thyromegaly, no cervical adenopathy  CVD: RRR, no M/R/G  Pulm: Normal work of breathing, CTAB, no wheezing  Abd:  Soft, NT, ND non TTP, no mass  MSK: no LE edema  Neuro: A&Ox3, gait normal, speech normal  Mood; Mood normal, behavior normal, thought process linear       LABS     Labs reviewed; ordered  ASSESSMENT     1. Annual physical exam  CBC Auto Differential    Comprehensive Metabolic Panel    Hemoglobin A1C    Lipid Panel      2. Major depressive disorder with single episode, in partial remission  LORazepam (ATIVAN) 0.5 MG tablet      3. Family history of prostate cancer                Plan     Neo Pérez is a 44 y.o. male with  1. Annual physical exam  Updated problem list, medical history, care team and discussed HM.     - CBC Auto Differential; Future  - Comprehensive Metabolic Panel; Future  - Hemoglobin A1C; Future  - Lipid Panel; Future    2. Major depressive disorder with single " episode, in partial remission  Using p.r.n. Ativan less than weekly  No longer on daily medication  Will consider Wellbutrin additional agent as needed  - LORazepam (ATIVAN) 0.5 MG tablet; Take 0.5 tablets (0.25 mg total) by mouth every 6 (six) hours as needed for Anxiety.  Dispense: 30 tablet; Refill: 1    3. Family history of prostate cancer  Will start prostate cancer screening at age 45 since he had a first-degree relative diagnosed with prostate cancer in his mid 50s    Celestino Lemos MD

## 2025-02-21 NOTE — PATIENT INSTRUCTIONS
[facility]  Phone: [phone]  Address: [address] [place] [id number]  Business Hours  Mon - Fri  9:00 am - 5:30 pm  Sat - Sun  Closed  *NOTE: We are OPEN on Saturday twice a month from 9:00am - 2:00pm. Please call to confirm monthly Saturday schedule.

## 2025-02-24 ENCOUNTER — RESULTS FOLLOW-UP (OUTPATIENT)
Dept: INTERNAL MEDICINE | Facility: CLINIC | Age: 45
End: 2025-02-24

## 2025-08-25 DIAGNOSIS — F32.4 MAJOR DEPRESSIVE DISORDER WITH SINGLE EPISODE, IN PARTIAL REMISSION: ICD-10-CM

## 2025-08-25 RX ORDER — LORAZEPAM 0.5 MG/1
0.25 TABLET ORAL EVERY 6 HOURS PRN
Qty: 30 TABLET | Refills: 1 | Status: SHIPPED | OUTPATIENT
Start: 2025-08-25 | End: 2025-09-24